# Patient Record
Sex: FEMALE | Race: WHITE | URBAN - METROPOLITAN AREA
[De-identification: names, ages, dates, MRNs, and addresses within clinical notes are randomized per-mention and may not be internally consistent; named-entity substitution may affect disease eponyms.]

---

## 2017-06-12 ENCOUNTER — IMPORTED ENCOUNTER (OUTPATIENT)
Dept: URBAN - METROPOLITAN AREA CLINIC 50 | Facility: CLINIC | Age: 52
End: 2017-06-12

## 2017-06-12 NOTE — PATIENT DISCUSSION
"""Follow dry ARMD without treatment. MVI/sunglasses/lutein/Amsler. Patient to call if vision changes or distortion increases. Good diet/do not smoke. """

## 2021-04-18 ASSESSMENT — VISUAL ACUITY
OD_OTHER: 20/30. 20/50.
OD_SC: 20/40
OD_PH: 20/25
OS_CC: J1+
OD_BAT: 20/30
OS_BAT: 20/30
OS_SC: 20/30+
OS_OTHER: 20/30. 20/40.
OD_CC: J1+

## 2021-04-18 ASSESSMENT — TONOMETRY
OS_IOP_MMHG: 15
OD_IOP_MMHG: 15

## 2022-02-16 ENCOUNTER — PREPPED CHART (OUTPATIENT)
Dept: URBAN - METROPOLITAN AREA CLINIC 48 | Facility: CLINIC | Age: 57
End: 2022-02-16

## 2022-02-16 NOTE — PATIENT DISCUSSION
"""Follow dry ARMD without treatment. MVI/sunglasses/lutein/Amsler. Patient to call if vision changes or distortion increases. Good diet/do not smoke. ""."

## 2022-02-28 ENCOUNTER — NEW PATIENT (OUTPATIENT)
Dept: URBAN - METROPOLITAN AREA CLINIC 48 | Facility: CLINIC | Age: 57
End: 2022-02-28

## 2022-02-28 DIAGNOSIS — H35.363: ICD-10-CM

## 2022-02-28 DIAGNOSIS — H16.221: ICD-10-CM

## 2022-02-28 DIAGNOSIS — Z01.01: ICD-10-CM

## 2022-02-28 DIAGNOSIS — H25.13: ICD-10-CM

## 2022-02-28 PROCEDURE — 92015 DETERMINE REFRACTIVE STATE: CPT

## 2022-02-28 PROCEDURE — 92004 COMPRE OPH EXAM NEW PT 1/>: CPT

## 2022-02-28 PROCEDURE — 92134 CPTRZ OPH DX IMG PST SGM RTA: CPT

## 2022-02-28 ASSESSMENT — KERATOMETRY
OD_AXISANGLE2_DEGREES: 85
OD_AXISANGLE_DEGREES: 175
OS_AXISANGLE2_DEGREES: 90
OS_K1POWER_DIOPTERS: 43.75
OD_K1POWER_DIOPTERS: 43.25
OD_K2POWER_DIOPTERS: 43.50
OS_K2POWER_DIOPTERS: 44.00
OS_AXISANGLE_DEGREES: 180

## 2022-02-28 ASSESSMENT — TONOMETRY
OD_IOP_MMHG: 14
OS_IOP_MMHG: 14

## 2022-02-28 ASSESSMENT — VISUAL ACUITY
OS_GLARE: 20/30
OU_SC: J7
OD_GLARE: 20/40
OS_GLARE: 20/40
OD_SC: 20/50
OD_PH: 20/25
OU_SC: 20/25
OD_SC: J16
OD_GLARE: 20/50
OS_SC: 20/25-1
OS_SC: J10

## 2022-02-28 NOTE — PATIENT DISCUSSION
Recommend artificial tears 4 times daily in both eyes for best vision and comfort. Brands such as Refresh, Thera Tears, and Systane are good options.

## 2023-06-14 ENCOUNTER — COMPREHENSIVE EXAM (OUTPATIENT)
Dept: URBAN - METROPOLITAN AREA CLINIC 52 | Facility: CLINIC | Age: 58
End: 2023-06-14

## 2023-06-14 DIAGNOSIS — H25.13: ICD-10-CM

## 2023-06-14 DIAGNOSIS — H35.363: ICD-10-CM

## 2023-06-14 DIAGNOSIS — H16.221: ICD-10-CM

## 2023-06-14 DIAGNOSIS — Z01.01: ICD-10-CM

## 2023-06-14 PROCEDURE — 92015 DETERMINE REFRACTIVE STATE: CPT

## 2023-06-14 PROCEDURE — 92134 CPTRZ OPH DX IMG PST SGM RTA: CPT

## 2023-06-14 PROCEDURE — 92014 COMPRE OPH EXAM EST PT 1/>: CPT

## 2023-06-14 ASSESSMENT — VISUAL ACUITY
OD_GLARE: 20/25
OS_CC: 20/25
OD_GLARE: 20/25
OU_CC: J1+
OD_PH: 20/25-2
OS_GLARE: 20/20
OS_GLARE: 20/20
OD_CC: 20/30-1

## 2023-06-14 ASSESSMENT — KERATOMETRY
OS_K1POWER_DIOPTERS: 43.25
OS_AXISANGLE_DEGREES: 144
OD_AXISANGLE2_DEGREES: 115
OD_K1POWER_DIOPTERS: 43.00
OS_K2POWER_DIOPTERS: 44.00
OD_AXISANGLE_DEGREES: 25
OD_K2POWER_DIOPTERS: 43.75
OS_AXISANGLE2_DEGREES: 54

## 2023-06-14 ASSESSMENT — TONOMETRY
OS_IOP_MMHG: 15
OD_IOP_MMHG: 15

## 2024-07-16 ENCOUNTER — COMPREHENSIVE EXAM (OUTPATIENT)
Dept: URBAN - METROPOLITAN AREA CLINIC 52 | Facility: CLINIC | Age: 59
End: 2024-07-16

## 2024-07-16 DIAGNOSIS — H52.4: ICD-10-CM

## 2024-07-16 DIAGNOSIS — Z01.01: ICD-10-CM

## 2024-07-16 PROCEDURE — 92014 COMPRE OPH EXAM EST PT 1/>: CPT

## 2024-07-16 PROCEDURE — 92015 DETERMINE REFRACTIVE STATE: CPT

## 2024-07-16 ASSESSMENT — KERATOMETRY
OD_AXISANGLE2_DEGREES: 133
OD_AXISANGLE_DEGREES: 25
OD_K1POWER_DIOPTERS: 43.00
OD_K2POWER_DIOPTERS: 43.75
OD_AXISANGLE2_DEGREES: 115
OD_AXISANGLE_DEGREES: 43
OS_AXISANGLE2_DEGREES: 47
OD_K1POWER_DIOPTERS: 43.25
OS_K1POWER_DIOPTERS: 43.25
OS_AXISANGLE_DEGREES: 144
OS_AXISANGLE_DEGREES: 137
OS_K2POWER_DIOPTERS: 44.00
OD_K2POWER_DIOPTERS: 43.50
OS_AXISANGLE2_DEGREES: 54

## 2024-07-16 ASSESSMENT — VISUAL ACUITY
OS_CC: 20/25
OD_GLARE: 20/30
OD_CC: 20/40
OU_CC: J1+
OS_GLARE: 20/40
OD_GLARE: 20/40
OU_CC: 20/20-2
OS_GLARE: 20/30

## 2024-07-16 ASSESSMENT — TONOMETRY
OD_IOP_MMHG: 12
OS_IOP_MMHG: 12